# Patient Record
Sex: MALE | Race: BLACK OR AFRICAN AMERICAN | NOT HISPANIC OR LATINO | URBAN - METROPOLITAN AREA
[De-identification: names, ages, dates, MRNs, and addresses within clinical notes are randomized per-mention and may not be internally consistent; named-entity substitution may affect disease eponyms.]

---

## 2023-01-05 ENCOUNTER — EMERGENCY (EMERGENCY)
Facility: HOSPITAL | Age: 48
LOS: 0 days | Discharge: HOME | End: 2023-01-05
Attending: EMERGENCY MEDICINE | Admitting: EMERGENCY MEDICINE
Payer: SELF-PAY

## 2023-01-05 VITALS
RESPIRATION RATE: 18 BRPM | HEIGHT: 73 IN | HEART RATE: 72 BPM | WEIGHT: 179.9 LBS | OXYGEN SATURATION: 97 % | SYSTOLIC BLOOD PRESSURE: 156 MMHG | TEMPERATURE: 97 F | DIASTOLIC BLOOD PRESSURE: 93 MMHG

## 2023-01-05 DIAGNOSIS — R10.30 LOWER ABDOMINAL PAIN, UNSPECIFIED: ICD-10-CM

## 2023-01-05 DIAGNOSIS — Z20.2 CONTACT WITH AND (SUSPECTED) EXPOSURE TO INFECTIONS WITH A PREDOMINANTLY SEXUAL MODE OF TRANSMISSION: ICD-10-CM

## 2023-01-05 DIAGNOSIS — N50.812 LEFT TESTICULAR PAIN: ICD-10-CM

## 2023-01-05 DIAGNOSIS — Z88.1 ALLERGY STATUS TO OTHER ANTIBIOTIC AGENTS STATUS: ICD-10-CM

## 2023-01-05 LAB
APPEARANCE UR: CLEAR — SIGNIFICANT CHANGE UP
BILIRUB UR-MCNC: NEGATIVE — SIGNIFICANT CHANGE UP
COLOR SPEC: YELLOW — SIGNIFICANT CHANGE UP
DIFF PNL FLD: NEGATIVE — SIGNIFICANT CHANGE UP
GLUCOSE UR QL: NEGATIVE MG/DL — SIGNIFICANT CHANGE UP
KETONES UR-MCNC: ABNORMAL
LEUKOCYTE ESTERASE UR-ACNC: NEGATIVE — SIGNIFICANT CHANGE UP
NITRITE UR-MCNC: NEGATIVE — SIGNIFICANT CHANGE UP
PH UR: 7 — SIGNIFICANT CHANGE UP (ref 5–8)
PROT UR-MCNC: NEGATIVE MG/DL — SIGNIFICANT CHANGE UP
SP GR SPEC: 1.02 — SIGNIFICANT CHANGE UP (ref 1.01–1.03)
UROBILINOGEN FLD QL: 4 MG/DL

## 2023-01-05 PROCEDURE — 99284 EMERGENCY DEPT VISIT MOD MDM: CPT

## 2023-01-05 RX ORDER — CEFTRIAXONE 500 MG/1
500 INJECTION, POWDER, FOR SOLUTION INTRAMUSCULAR; INTRAVENOUS ONCE
Refills: 0 | Status: COMPLETED | OUTPATIENT
Start: 2023-01-05 | End: 2023-01-05

## 2023-01-05 RX ORDER — AZITHROMYCIN 500 MG/1
2 TABLET, FILM COATED ORAL ONCE
Refills: 0 | Status: COMPLETED | OUTPATIENT
Start: 2023-01-05 | End: 2023-01-05

## 2023-01-05 RX ADMIN — CEFTRIAXONE 500 MILLIGRAM(S): 500 INJECTION, POWDER, FOR SOLUTION INTRAMUSCULAR; INTRAVENOUS at 19:58

## 2023-01-05 RX ADMIN — AZITHROMYCIN 2 GRAM(S): 500 TABLET, FILM COATED ORAL at 19:58

## 2023-01-05 NOTE — ED PROVIDER NOTE - NSFOLLOWUPINSTRUCTIONS_ED_ALL_ED_FT
[employer] CareNotes®     :  [facility]             SEXUALLY TRANSMITTED DISEASES - General Information     Sexually Transmitted Diseases    WHAT YOU NEED TO KNOW:    What is a sexually transmitted disease? A sexually transmitted disease (STD), is also called a sexually transmitted infection (STI). An STD is an infection caused by bacteria or a virus. STDs are spread by oral, genital, or anal sex. Some examples of STDs are HIV, chlamydia, syphilis, and gonorrhea.    What increases my risk for an STD?     Unprotected sex      Being female      Illegal IV drug use      Multiple partners      Not vaccinated      A weak immune system    What are the signs and symptoms of an STD? You may have one or more of the following depending on the STD you have:     Blisters, warts, sores, or a rash on your skin that may be painful      Discharge from the penis, vagina, or anus that may have a foul smell      Fever, muscle pain, or swollen lymph nodes in the groin      Inflammation and itching of the skin      Pelvic or abdominal pain, or pain during sex or when urinating      Sore throat, mouth ulcers, or trouble swallowing      Vaginal bleeding or spotting after sex in females    How is an STD diagnosed? Your healthcare provider will examine you and ask about your symptoms. He may ask you about your sexual history or other medical conditions. He will ask you if you have had an STD before. If you are a woman, you may need a pelvic exam to check your vagina, cervix, and other organs. You may also need any of the following:     Blood and urine tests may show an infection and what kind it is.      A sample of discharge may show what is causing your STD.    How is an STD treated? Treatment depends on the STD you have. You may need medicines to treat the infection.    How can I help prevent an STD? Ask your healthcare provider for more information about the following safe sex practices:    Use condoms. Use a latex condom if you have oral, genital, or anal sex. Use a new condom each time. Use a polyurethane condom if you are allergic to latex.      Do not douche. Douching upsets the normal balance of bacteria are found in your vagina. It does not prevent or clear up vaginal infections.      Do not have sex with someone who has an STD. This includes oral and anal sex.      Limit sexual partners. Have sex with one person who is not having sex with anyone else.       Do not have sex during treatment. Do not have sex while you or your partners are being treated for an STD.      Get screening tests regularly if you are sexually active.      Get vaccinated. Vaccines may help to prevent your risk of some STDs. Ask your healthcare provider for more information about vaccines for STDs.    When should I seek immediate care?     You have genital swelling or pain, or unusual bleeding.      You have joint pain, rash, swollen lymph nodes, or night sweats.      You have severe abdominal pain.    When should I contact my healthcare provider?     You have a fever.       Your symptoms do not go away or they get worse, even after treatment.      You have bleeding or pain during sex.      You have questions or concerns about your condition or care.    CARE AGREEMENT:    You have the right to help plan your care. Learn about your health condition and how it may be treated. Discuss treatment options with your healthcare providers to decide what care you want to receive. You always have the right to refuse treatment.        © Copyright [employer] 2019       back to top                      © Copyright [employer] 2019

## 2023-01-05 NOTE — ED PROVIDER NOTE - PATIENT PORTAL LINK FT
You can access the FollowMyHealth Patient Portal offered by [facility] by registering at the following website: [email or web address]. By joining [employer]’s FollowMyHealth portal, you will also be able to view your health information using other applications (apps) compatible with our system.

## 2023-01-05 NOTE — ED PROVIDER NOTE - CLINICAL SUMMARY MEDICAL DECISION MAKING FREE TEXT BOX
47-year-old male presents for evaluation after having unprotected intercourse with a female who tested positive for chlamydia, patient has no symptoms whatsoever, abdomen soft nontender,  exam unremarkable, circumcised, will give prophylactic dose, culture sent.  Patient counseled regarding conditions which should prompt return.

## 2023-01-05 NOTE — ED PROVIDER NOTE - NS ED ATTENDING STATEMENT MOD
This was a shared visit with the [facility]. I reviewed and verified the documentation and independently performed the documented:

## 2023-01-05 NOTE — ED PROVIDER NOTE - NS ED ROS FT
Constitutional: No fever, chills.  Eyes:  No visual changes  ENMT:  No neck pain  Cardiac:  No chest pain  Respiratory:  No cough, SOB  GI:  No nausea, vomiting, diarrhea, abdominal pain.  :  No dysuria, hematuria  MS:  No back pain.  Neuro:  No headache or lightheadedness  Skin:  No skin rash  : no testicular pain/rash/penile discharge  Except as documented in the HPI,  all other systems are negative.

## 2023-01-05 NOTE — ED PROVIDER NOTE - OBJECTIVE STATEMENT
48 yo M no pmhx, no shx presenting to the ED reporting his partner tested positive for std, chlamydia. pt admits to having unprotected sex recently with partner. pt reports he is asymptomatic at this time. reports had mild lower abd discomfort yesterday which has since resolved, also reported mild L testicular discomfort which has resolved. denies any fever, chills, nausea, vomiting, dysuria, hematuria, penile discharge/penile rash

## 2023-01-05 NOTE — ED PROVIDER NOTE - PHYSICAL EXAMINATION
GENERAL: Well-nourished, Well-developed. NAD.  HEAD: No visible or palpable bumps or hematomas. No ecchymosis behind ears B/L.  Eyes: PERRLA, EOMI. No asymmetry. No nystagmus. No conjunctival injection. Non-icteric sclera.  ENMT: MMM.   Neck: Supple. FROM  CVS: RRR. Normal S1,S2. No murmurs appreciated on auscultation   RESP: No use of accessory muscles. Chest rise symmetrical with good expansion. Lungs clear to auscultation B/L. No wheezing, rales, or rhonchi auscultated.  GI: Normal auscultation of bowel sounds in all 4 quadrants. Soft, Nontender, Nondistended. No guarding or rebound tenderness. No CVAT B/L.  Skin: Warm, Dry. No rashes or lesions. Good cap refill < 2 sec B/L.  EXT: Radial and pedal pulses present B/L. No calf tenderness or swelling B/L. No palpable cords. No pedal edema B/L.  : Testes non-tender and descended B/L. No penile sores. No erythema. No palpable LNs. no penile discharge. cremasteric reflex intact b/l. performed by dr ulloa, chaperoned by myself

## 2023-01-06 LAB
C TRACH RRNA SPEC QL NAA+PROBE: SIGNIFICANT CHANGE UP
N GONORRHOEA RRNA SPEC QL NAA+PROBE: SIGNIFICANT CHANGE UP
SPECIMEN SOURCE: SIGNIFICANT CHANGE UP